# Patient Record
Sex: FEMALE | Race: BLACK OR AFRICAN AMERICAN
[De-identification: names, ages, dates, MRNs, and addresses within clinical notes are randomized per-mention and may not be internally consistent; named-entity substitution may affect disease eponyms.]

---
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
## 2018-09-26 NOTE — PHYS DOC
Past Medical History


Past Medical History:  Depression, Hypertension, Other


Additional Past Medical Histor:  TUMORS


Past Surgical History:  Tonsillectomy


Additional Information:  


0.25 PPD


Alcohol Use:  Occasionally


Drug Use:  None





Adult General


Chief Complaint


Chief Complaint:  COUGH





HPI


HPI





Patient is a 38  year old female with history of depression, hypertension, 

smoking, who presents today complaining of a productive cough, body aches, and 

bilateral ear pain for 4 days. Patient denies any fever. Denies any chest pain 

or shortness of breath.





Review of Systems


Review of Systems





Constitutional: Reports body aches. Denies fever or chills []


Eyes: Denies change in visual acuity, redness, or eye pain []


HENT: Reports bilateral ear pain. Denies nasal congestion or sore throat []


Respiratory: Reports productive cough, denies shortness of breath []


Cardiovascular: No additional information not addressed in HPI []


GI: Denies abdominal pain, nausea, vomiting, bloody stools or diarrhea []


: Denies dysuria or hematuria []


Musculoskeletal: Denies back pain or joint pain []


Integument: Denies rash or skin lesions []


Neurologic: Denies headache, focal weakness or sensory changes []








All other systems were reviewed and found to be within normal limits, except as 

documented in this note.





Current Medications


Current Medications





Current Medications








 Medications


  (Trade)  Dose


 Ordered  Sig/Momo  Start Time


 Stop Time Status Last Admin


Dose Admin


 


 Albuterol/


 Ipratropium


  (Duoneb)  3 ml  1X  ONCE  9/26/18 14:00


 9/26/18 14:01 DC  





 


 Benzonatate


  (Tessalon Perle)  100 mg  1X  ONCE  9/26/18 14:00


 9/26/18 14:01 DC 9/26/18 14:13


100 MG











Allergies


Allergies





Allergies








Coded Allergies Type Severity Reaction Last Updated Verified


 


  Penicillins Allergy Intermediate hives 8/7/16 Yes


 


  morphine Allergy Unknown  12/16/16 Yes











Physical Exam


Physical Exam





Constitutional: Well developed, well nourished, no acute distress, non-toxic 

appearance. []


HENT: Normocephalic, atraumatic, bilateral external ears normal, oropharynx 

moist, no oral exudates, nose normal. []


Eyes: PERRLA, EOMI, conjunctiva normal, no discharge. [] 


Neck: Normal range of motion, no tenderness, supple, no stridor. [] 


Cardiovascular:Heart rate regular rhythm, no murmur []


Lungs & Thorax: Diminished breath sounds to bilateral posterior lung bases.


Abdomen: Bowel sounds normal, soft, no tenderness, no masses, no pulsatile 

masses. [] 


Skin: Warm, dry, no erythema, no rash. [] 


Back: No tenderness, no CVA tenderness. [] 


Extremities: No tenderness, no cyanosis, no clubbing, ROM intact, no edema. [] 


Neurologic: Alert and oriented X 3, normal motor function, normal sensory 

function, no focal deficits noted. []


Psychologic: Affect normal, judgement normal, mood normal. []





Current Patient Data


Vital Signs





 Vital Signs








  Date Time  Temp Pulse Resp B/P (MAP) Pulse Ox O2 Delivery O2 Flow Rate FiO2


 


9/26/18 13:16 97.8 78 20 135/84 (101) 98 Room Air  





 97.8       











EKG


EKG


[]





Radiology/Procedures


Radiology/Procedures


[]PROCEDURE: CHEST PA & LATERAL





 


Chest, PA and Lateral:


 


Technique:  PA and lateral views of the chest were obtained.


 


History:  Cough, weakness.


 


Comparison: 12/16/2016.


 


Findings:


 


 The heart and pulmonary vasculature appear within normal limits. Mild 


airspace opacity identified in the right lower lobe of the lung.. The 


pleural margins are clear.


 


Impression:


 


Mild airspace opacity identified in the right lower lobe lung likely 


atelectasis or infiltrate. Follow-up to resolution.. 


 


Electronically signed by: Ashok Tracey MD (9/26/2018 1:41 PM) [id number]














DICTATED and SIGNED BY:     ASHOK TRACEY MD


DATE:     09/26/18 1340





Course & Med Decision Making


Course & Med Decision Making


Pertinent Labs and Imaging studies reviewed. (See chart for details)





This is a 38-year-old female patient presenting to the ED today with a 

productive cough bilateral ear pain and body aches for 4 days. Chest x-ray 

interpreted by radiologist was suspicious for right lower lobe infiltrate or 

atelectasis. Consider physical exam him leaning towards infiltrate. Patient was 

encouraged to consider smoking cessation. Discharged with azithromycin. Also 

given prescription for albuterol inhaler, 80 ml of codeine with promethazine 

and Tessalon Perles. Follow-up with primary care doctor in 2-7 days.














Staff Physician Addendum:


I was working in the ER during the course of this patient's visit.  I was 

available for consultation as needed, but I was not directly involved in the 

care of this patient.   


clark Lima Disclaimer


Dragon Disclaimer


This electronic medical record was generated, in whole or in part, using a 

voice recognition dictation system.





Departure


Departure


Impression:  


 Primary Impression:  


 Right lower lobe pneumonia


 Additional Impressions:  


 Otalgia of both ears


 Body aches


 Smoking addiction


Disposition:  01 HOME, SELF-CARE


Condition:  STABLE


Referrals:  


NO PCP (PCP)


Follow-up with your doctor in 2-7 days


Patient Instructions:  Otalgia-Brief, Pneumonia, Adult, Smoking Cessation





Additional Instructions:  


You have pneumonia. Please consider smoking cessation. Please use the 

prescribed medications as ordered. Please complete your oral antibiotics. 

Please follow-up with your own doctor in one week. Come back to the emergency 

room at any point symptoms worsen.


Scripts


Promethazine Hcl/Codeine (PROMETHAZINE-CODEINE SYRUP) 118 Ml Syrup


5 ML PO Q4-6HRS, #80 ML


   Prov: DONNIE CUTLER         9/26/18 


Benzonatate (TESSALON PERLE) 100 Mg Capsule


1 CAP PO TID, #30 CAP


   Prov: DONNIE CUTLER         9/26/18 


Albuterol Sulfate (VENTOLIN HFA INHALER) 18 Gm Hfa.aer.ad


2 PUFF INH Q4HRS for FOR ASTHMA, #1 INHALER 0 Refills


   Prov: DONNIE CUTLER         9/26/18 


Azithromycin (ZITHROMAX) 250 Mg Tablet


1 PKG PO UD, #1 PKG


   Prov: DONNIE CUTLER         9/26/18





Problem Qualifiers








 Primary Impression:  


 Right lower lobe pneumonia


 Pneumonia type:  due to unspecified organism  Qualified Codes:  J18.1 - Lobar 

pneumonia, unspecified organism








DONNIE CUTLER Sep 26, 2018 14:14


TARAS KENT MD Sep 30, 2018 18:59

## 2018-09-26 NOTE — RAD
Chest, PA and Lateral:

 

Technique:  PA and lateral views of the chest were obtained.

 

History:  Cough, weakness.

 

Comparison: 12/16/2016.

 

Findings:

 

 The heart and pulmonary vasculature appear within normal limits. Mild 

airspace opacity identified in the right lower lobe of the lung.. The 

pleural margins are clear.

 

Impression:

 

Mild airspace opacity identified in the right lower lobe lung likely 

atelectasis or infiltrate. Follow-up to resolution.. 

 

Electronically signed by: Ashok Tracey MD (9/26/2018 1:41 PM) [id number]

## 2019-02-21 NOTE — RAD
PELVIS W/TV:  2/21/2019 6:41 PM

 

INDICATION: 38 years old Female. PELVIC PAIN.

 

COMPARISON: None.

 

TECHNIQUE: Transabdominal and transvaginal sonographic evaluation of the 

pelvis was performed.  Grayscale, color Doppler and spectral waveform 

analysis was performed.

 

FINDINGS: 

 

UTERUS:

Size: 8.8 x 4.8 x 6.4 cm.

Masses: Within the cervix there is a 9 x 8 mm circumscribed mass with 

calcification which may represent a uterine fibroid versus complicated 

nabothian cyst. There is a posterior uterine fibroid measuring 2.1 x 1.3 

cm in the mid body which is partially intramural and partially subserosal.

Endometrium: 15.3 mm.

 

RIGHT OVARY: 3.5 x 3.1 x 3.0 cm. Follicular changes are present.

 

LEFT OVARY: 3.6 x 1.8 x 1.7 cm.  Follicular changes are present.

 

Arterial and venous waveforms are identified bilaterally at the time of 

imaging.

 

FREE FLUID: None.

 

URINARY BLADDER: Unremarkable.

 

 

IMPRESSION:

 

1. 9 x 8 mm circumscribed mass with calcification within the cervix. This 

may represent a complicated nabothian cyst versus less likely fibroid.

2. Posterior uterine fibroid is partially intramural and partially 

subserosal measuring 2.1 x 1.3 x 1.3 cm.

 

Electronically signed by: Winter Hensley MD (2/21/2019 7:36 PM) 

[facility]

## 2019-02-21 NOTE — PHYS DOC
Past Medical History


Past Medical History:  Depression, Hypertension


Additional Past Medical Histor:  TUMORS


 (DONNIE CUTLER)


Past Surgical History:  Tonsillectomy


 (DONNIE CUTLER)


Alcohol Use:  Rarely


Drug Use:  None


 (DONNIE CUTLER)





Adult General


Chief Complaint


Chief Complaint:  VAGINAL BLEEDING





HPI


HPI





Patient is a 38  year old female who presents who presents complaining of 

spotting for 3 weeks. Patient also is complaining of pelvic pain described as 

intermittent and throbbing rated at 10 out of 10. She states she feels her 

overall result touching each other. Patient denies any nausea vomiting. Denies 

any chance she is pregnant. Denies any fever. She is also complaining of 

dysuria.


 (DONNIE CUTLER)





Review of Systems


Review of Systems





Constitutional: Denies fever or chills []


Eyes: Denies change in visual acuity, redness, or eye pain []


HENT: Denies nasal congestion or sore throat []


Respiratory: Denies cough or shortness of breath []


Cardiovascular: No additional information not addressed in HPI []


GI: Reports abdominal cramping, spotting, denies nausea, vomiting, bloody 

stools or diarrhea []


: Reports dysuria, hematuria []


Musculoskeletal: Denies back pain or joint pain []


Integument: Denies rash or skin lesions []


Neurologic: Denies headache, focal weakness or sensory changes []








All other systems were reviewed and found to be within normal limits, except as 

documented in this note.


 (DONNIE CUTLER)





Current Medications


Current Medications





Current Medications








 Medications


  (Trade)  Dose


 Ordered  Sig/Momo  Start Time


 Stop Time Status Last Admin


Dose Admin


 


 Azithromycin


  (Zithromax)  1,000 mg  1X  ONCE  2/21/19 19:45


 2/21/19 19:46 DC 2/21/19 19:46


1,000 MG


 


 Ceftriaxone Sodium


  (Rocephin Im)  250 mg  1X  ONCE  2/21/19 19:45


 2/21/19 19:46 DC 2/21/19 19:47


250 MG


 


 Fentanyl Citrate


  (Fentanyl 2ml


 Vial)  50 mcg  1X  ONCE  2/21/19 18:15


 2/21/19 18:16 DC 2/21/19 19:06


50 MCG


 


 Metronidazole


  (Flagyl)  2,000 mg  1X  ONCE  2/21/19 19:45


 2/21/19 19:46 DC 2/21/19 19:45


2,000 MG


 


 Ondansetron HCl


  (Zofran)  4 mg  1X  ONCE  2/21/19 18:15


 2/21/19 18:16 DC 2/21/19 19:05


4 MG





 (ELHAM FLORES DO)





Allergies


Allergies





Allergies








Coded Allergies Type Severity Reaction Last Updated Verified


 


  Penicillins Allergy Intermediate hives 8/7/16 Yes


 


  morphine Allergy Unknown  12/16/16 Yes





 (ELHAM FLORES DO)





Physical Exam


Physical Exam





Constitutional: Well developed, well nourished, no acute distress, non-toxic 

appearance. []


HENT: Normocephalic, atraumatic, bilateral external ears normal, oropharynx 

moist, no oral exudates, nose normal. []


Eyes: PERRLA, EOMI, conjunctiva normal, no discharge. [] 


Neck: Normal range of motion, no tenderness, supple, no stridor. [] 


Cardiovascular:Heart rate regular rhythm, no murmur []


Lungs & Thorax:  Bilateral breath sounds clear to auscultation []


Abdomen: Bowel sounds normal, soft, no tenderness, no masses, no pulsatile 

masses. [] 


Pelvic exam


External pelvic appears normal, cervix not visualized due to body habitus. No 

adnexal tenderness, mild CMT and exam, mild amount of white discharge in the 

vaginal vault. No bleeding noted in exam.


Skin: Warm, dry, no erythema, no rash. [] 


Back: No tenderness, no CVA tenderness. [] 


Extremities: No tenderness, no cyanosis, no clubbing, ROM intact, no edema. [] 


Neurologic: Alert and oriented X 3, normal motor function, normal sensory 

function, no focal deficits noted. []


Psychologic: Affect normal, judgement normal, mood normal. []


 (DONNIE CUTLER)





Current Patient Data


Vital Signs





 Vital Signs








  Date Time  Temp Pulse Resp B/P (MAP) Pulse Ox O2 Delivery O2 Flow Rate FiO2


 


2/21/19 19:54  78  129/76 (93) 98 Room Air  


 


2/21/19 18:25   23     


 


2/21/19 16:45 98.8       





 98.8       





 (ELHAM FLORES DO)


Lab Values





 Laboratory Tests








Test


 2/21/19


15:50 2/21/19


18:03 2/21/19


18:15


 


Urine Collection Type Unknown    


 


Urine Color Yellow    


 


Urine Clarity Cloudy    


 


Urine pH 5.5    


 


Urine Specific Gravity >=1.030    


 


Urine Protein


 Negative mg/dL


(NEG-TRACE) 


 





 


Urine Glucose (UA)


 Negative mg/dL


(NEG) 


 





 


Urine Ketones (Stick)


 Negative mg/dL


(NEG) 


 





 


Urine Blood


 Negative (NEG)


 


 





 


Urine Nitrite


 Negative (NEG)


 


 





 


Urine Bilirubin


 Negative (NEG)


 


 





 


Urine Urobilinogen Dipstick


 0.2 mg/dL (0.2


mg/dL) 


 





 


Urine Leukocyte Esterase


 Moderate (NEG)


 


 





 


Urine RBC


 Occ /HPF (0-2)


 


 





 


Urine WBC


 20-40 /HPF


(0-4) 


 





 


Urine Squamous Epithelial


Cells Many /LPF  


 


 





 


Urine Bacteria


 Moderate /HPF


(0-FEW) 


 





 


Urine Mucus Marked /LPF    


 


Urine Trichomonas Present    


 


POC Urine HCG, Qualitative


 


 Hcg negative


(Negative) 





 


White Blood Count


 


 


 10.3 x10^3/uL


(4.0-11.0)


 


Red Blood Count


 


 


 4.14 x10^6/uL


(3.50-5.40)


 


Hemoglobin


 


 


 11.9 g/dL


(12.0-15.5)  L


 


Hematocrit


 


 


 36.7 %


(36.0-47.0)


 


Mean Corpuscular Volume


 


 


 89 fL ()





 


Mean Corpuscular Hemoglobin   29 pg (25-35)  


 


Mean Corpuscular Hemoglobin


Concent 


 


 32 g/dL


(31-37)


 


Red Cell Distribution Width


 


 


 16.4 %


(11.5-14.5)  H


 


Platelet Count


 


 


 186 x10^3/uL


(140-400)


 


Neutrophils (%) (Auto)   64 % (31-73)  


 


Lymphocytes (%) (Auto)   31 % (24-48)  


 


Monocytes (%) (Auto)   4 % (0-9)  


 


Eosinophils (%) (Auto)   0 % (0-3)  


 


Basophils (%) (Auto)   0 % (0-3)  


 


Neutrophils # (Auto)


 


 


 6.6 x10^3uL


(1.8-7.7)


 


Lymphocytes # (Auto)


 


 


 3.2 x10^3/uL


(1.0-4.8)


 


Monocytes # (Auto)


 


 


 0.4 x10^3/uL


(0.0-1.1)


 


Eosinophils # (Auto)


 


 


 0.0 x10^3/uL


(0.0-0.7)


 


Basophils # (Auto)


 


 


 0.0 x10^3/uL


(0.0-0.2)


 


Sodium Level


 


 


 138 mmol/L


(136-145)


 


Potassium Level


 


 


 3.6 mmol/L


(3.5-5.1)


 


Chloride Level


 


 


 103 mmol/L


()


 


Carbon Dioxide Level


 


 


 27 mmol/L


(21-32)


 


Anion Gap   8 (6-14)  


 


Blood Urea Nitrogen


 


 


 14 mg/dL


(7-20)


 


Creatinine


 


 


 0.8 mg/dL


(0.6-1.0)


 


Estimated GFR


(Cockcroft-Gault) 


 


 96.1  





 


BUN/Creatinine Ratio   18 (6-20)  


 


Glucose Level


 


 


 138 mg/dL


(70-99)  H


 


Calcium Level


 


 


 8.5 mg/dL


(8.5-10.1)


 


Total Bilirubin


 


 


 0.1 mg/dL


(0.2-1.0)  L


 


Aspartate Amino Transferase


(AST) 


 


 12 U/L (15-37)


L


 


Alanine Aminotransferase (ALT)


 


 


 16 U/L (14-59)





 


Alkaline Phosphatase


 


 


 79 U/L


()


 


Total Protein


 


 


 7.3 g/dL


(6.4-8.2)


 


Albumin


 


 


 3.3 g/dL


(3.4-5.0)  L


 


Albumin/Globulin Ratio


 


 


 0.8 (1.0-1.7)


L


 


Lipase


 


 


 86 U/L


()





 Laboratory Tests


2/21/19 18:15








 Laboratory Tests


2/21/19 18:15











Microbiology


2/21/19 Wet Prep - Final, Complete


          


2/21/19 Urine Culture - Final, Complete


          


2/21/19 Urine Culture Result 1 (CHUCK) - Final, Complete


          


 (ELHAM FLORES DO)





EKG


EKG


[]


 (DONNIE CUTLER)





Radiology/Procedures


Radiology/Procedures


[]


 (DONNIE CUTLER)





Course & Med Decision Making


Course & Med Decision Making


Pertinent Labs and Imaging studies reviewed. (See chart for details)





This is a 38-year-old female patient presenting to the ED today complaining of 

pelvic pain, vaginal spotting and dysuria symptoms for 3 weeks. Negative urine 

hCG. CBC with a normal WBC. CMP with no acute findings. Urine analysis is noted 

for Trichomonas, moderate amount of leukocytes, white prep noted for 

Trichomonas and bacterial vaginosis. Patient treated for STDs in the ED. Pelvic 

ultrasound was noted for fibroid or nabothian cyst. Recommended this patient to 

follow up with an OB/GYN which I provided. Also discussed STD education 

especially the need to use protection. 





 (DONNIE CUTLER)





Dragon Disclaimer


Dragon Disclaimer


This electronic medical record was generated, in whole or in part, using a 

voice recognition dictation system.


 (DONNIE CUTLER)





Departure


Departure


Impression:  


 Primary Impression:  


 Urinary tract infection


 Additional Impressions:  


 Trichomonas infection


 Bacterial vaginosis


 Fibroids


Disposition:  01 HOME, SELF-CARE


Condition:  STABLE


Referrals:  


NO PCP (PCP)








MANSOOR BAZAN MD


follow up next week


Patient Instructions:  Fibroids, Easy-to-Read, Trichomonas, Test, Urinary Tract 

Infection





Additional Instructions:  


You were seen in the emergency room, you have a sexually transmitted diseases, 

UTI and bacterial vaginosis. Complete your prescribed antibiotics. Contact all 

your sex partners, let them know you were diagnosed with an STD and ask them to 

seek treatment. Your ultrasound shows  you have fibroids. Follow up with the 

provided OBGYN in 2 weeks. No sex for two weeks


Scripts


Sulfamethoxazole/Trimethoprim (BACTRIM DS TABLET) 1 Each Tablet


1 TAB PO BID, #14 TAB


   Prov: DONNIE CUTLER         2/21/19 


Metronidazole (FLAGYL) 500 Mg Tablet


1 TAB PO BID, #10 TAB


   Prov: DONNIE CUTLER         2/21/19





Attending Signature


Attending Signature


I have reviewed the PA/NP's note and plan of care. I was available for 

consultation as needed during the patient's visit in the emergency department. 

I agree with the clinical impression, plan, and disposition.


 (ELHAM FLORES DO)





Problem Qualifiers








 Primary Impression:  


 Urinary tract infection


 Urinary tract infection type:  site unspecified  Hematuria presence:  without 

hematuria  Qualified Codes:  N39.0 - Urinary tract infection, site not specified








DONNIE CUTLER Feb 21, 2019 18:51


ELHAM FLORES DO Feb 25, 2019 13:58

## 2019-12-07 ENCOUNTER — HOSPITAL ENCOUNTER (EMERGENCY)
Dept: HOSPITAL 61 - ER | Age: 39
LOS: 1 days | Discharge: HOME | End: 2019-12-08
Payer: SELF-PAY

## 2019-12-07 VITALS — WEIGHT: 293 LBS | BODY MASS INDEX: 47.09 KG/M2 | HEIGHT: 66 IN

## 2019-12-07 DIAGNOSIS — Z88.0: ICD-10-CM

## 2019-12-07 DIAGNOSIS — Z88.6: ICD-10-CM

## 2019-12-07 DIAGNOSIS — M54.5: Primary | ICD-10-CM

## 2019-12-07 DIAGNOSIS — F32.9: ICD-10-CM

## 2019-12-07 DIAGNOSIS — G89.29: ICD-10-CM

## 2019-12-07 DIAGNOSIS — Z90.89: ICD-10-CM

## 2019-12-07 DIAGNOSIS — I10: ICD-10-CM

## 2019-12-07 LAB
APTT PPP: YELLOW S
BACTERIA #/AREA URNS HPF: (no result) /HPF
BILIRUB UR QL STRIP: NEGATIVE
FIBRINOGEN PPP-MCNC: CLEAR MG/DL
NITRITE UR QL STRIP: NEGATIVE
PH UR STRIP: 7.5 [PH]
PROT UR STRIP-MCNC: NEGATIVE MG/DL
RBC #/AREA URNS HPF: 0 /HPF (ref 0–2)
SQUAMOUS #/AREA URNS LPF: (no result) /LPF
UROBILINOGEN UR-MCNC: 0.2 MG/DL
WBC #/AREA URNS HPF: (no result) /HPF (ref 0–4)

## 2019-12-07 PROCEDURE — 96372 THER/PROPH/DIAG INJ SC/IM: CPT

## 2019-12-07 PROCEDURE — 87086 URINE CULTURE/COLONY COUNT: CPT

## 2019-12-07 PROCEDURE — 81001 URINALYSIS AUTO W/SCOPE: CPT

## 2019-12-07 PROCEDURE — 81025 URINE PREGNANCY TEST: CPT

## 2019-12-07 PROCEDURE — 99284 EMERGENCY DEPT VISIT MOD MDM: CPT

## 2019-12-07 NOTE — PHYS DOC
Past Medical History


Past Medical History:  Depression, Hypertension


Additional Past Medical Histor:  TUMORS


Past Surgical History:  Tonsillectomy


Alcohol Use:  Rarely


Drug Use:  None





Adult General


Chief Complaint


Chief Complaint:  LOWER EXT PAIN





HPI


HPI





Patient is a 39 AA year old female with history of chronic back pain uterine 

fibroids who presents with low back pain radiating to her leg and pelvic pain. 

Symptoms are long-standing but patient reports increased back pain over the past

several days. Patient has taken over-the-counter pain medication without relief.

Patient has been seen in the emergency department various times for pelvic pain 

and pain-related complaints. She is currently without health insurance and has 

not followed up with a gynecologist or primary care physician. Patient noted to 

be hypertensive, 180s over 110s. Patient's previously prescribed was in her 

throat but has not taking any medications for the past 3 months. No chest pain, 

palpitations, decreased urinary output. No other acute symptoms or complaints.[]





Review of Systems


Review of Systems


ROS as per HPI





All other systems were reviewed and found to be within normal limits, except as 

documented in this note.





Current Medications


Current Medications





Current Medications








 Medications


  (Trade)  Dose


 Ordered  Sig/Momo  Start Time


 Stop Time Status Last Admin


Dose Admin


 


 Clonidine HCl


  (Catapres)  0.2 mg  1X  ONCE  12/7/19 23:00


 12/7/19 23:01 DC 12/7/19 22:51


0.2 MG


 


 Ketorolac


 Tromethamine


  (Toradol Im)  60 mg  1X  ONCE  12/7/19 23:30


 12/7/19 23:31 DC 12/7/19 23:01


60 MG


 


 Lisinopril


  (Prinivil)  20 mg  1X  ONCE  12/7/19 23:30


 12/7/19 23:31 DC 12/8/19 00:05


20 MG











Allergies


Allergies





Allergies








Coded Allergies Type Severity Reaction Last Updated Verified


 


  Penicillins Allergy Intermediate hives 8/7/16 Yes


 


  morphine Allergy Intermediate  12/7/19 Yes











Physical Exam


Physical Exam





Constitutional: Well developed, well nourished, moderate discomfort secondary to

 pain. []


HENT: Normocephalic, atraumatic, bilateral external ears normal, oropharynx 

moist, nose normal. []


Eyes: PERRLA, EOMI, conjunctiva hetal. [] 


Neck: Normal range of motion, no tenderness. [] 


Cardiovascular:Heart rate regular rhythm, no murmur []


Lungs & Thorax:  Bilateral breath sounds clear to auscultation []


Abdomen: Bowel sounds normal, soft, no tenderness, obesity compromising exam. []

 


Skin: Warm, dry. [] 


Back: Diffuse low back pain. [] 


Extremities: No tenderness, no edema. [] 


Neurologic: Alert and oriented X 3, normal motor function, normal sensory 

function, no focal deficits noted. []


Psychologic: Affect normal, judgement normal, mood normal. []





Current Patient Data


Vital Signs





                                   Vital Signs








  Date Time  Temp Pulse Resp B/P (MAP) Pulse Ox O2 Delivery O2 Flow Rate FiO2


 


12/8/19 00:05  98  149/119    


 


12/8/19 00:00     97 Room Air  


 


12/7/19 22:11 98.1  18     





 98.1       








Lab Values





                                Laboratory Tests








Test


 12/7/19


22:21 12/7/19


22:25


 


Urine Collection Type Unknown   


 


Urine Color Yellow   


 


Urine Clarity Clear   


 


Urine pH 7.5   


 


Urine Specific Gravity >=1.030   


 


Urine Protein


 Negative mg/dL


(NEG-TRACE) 





 


Urine Glucose (UA)


 Negative mg/dL


(NEG) 





 


Urine Ketones (Stick)


 Negative mg/dL


(NEG) 





 


Urine Blood


 Negative (NEG)


 





 


Urine Nitrite


 Negative (NEG)


 





 


Urine Bilirubin


 Negative (NEG)


 





 


Urine Urobilinogen Dipstick


 0.2 mg/dL (0.2


mg/dL) 





 


Urine Leukocyte Esterase


 Negative (NEG)


 





 


Urine RBC 0 /HPF (0-2)   


 


Urine WBC


 Occ /HPF (0-4)


 





 


Urine Squamous Epithelial


Cells Mod /LPF  


 





 


Urine Bacteria


 Moderate /HPF


(0-FEW) 





 


Urine Mucus Mod /LPF   


 


POC Urine HCG, Qualitative


 


 Hcg negative


(Negative)











EKG


EKG


[]





Radiology/Procedures


Radiology/Procedures


[]





Course & Med Decision Making


Course & Med Decision Making


Pertinent Labs and Imaging studies reviewed. (See chart for details)





[Producible flank pain with radiculopathy and chronic pelvic pain history of 

uterine fibroids. Patient resting well after treatment.. Clonidine given to 

address chronic hypertension and patient restarted on lisinopril. Patient 

instructed to follow-up with local PCP for further management. Return 

precautions reviewed.]





Dragon Disclaimer


Dragon Disclaimer


This electronic medical record was generated, in whole or in part, using a voice

recognition dictation system.





Departure


Departure


Impression:  


   Primary Impression:  


   Low back pain


   Additional Impression:  


   HTN (hypertension)


Disposition:  01 HOME, SELF-CARE


Condition:  IMPROVED


Referrals:  


NO PCP (PCP)


Scripts


Cyclobenzaprine Hcl (CYCLOBENZAPRINE HCL) 10 Mg Tablet


1 TAB PO TID, #30 TAB


   Prov: TIERNEY PISANO DO         12/7/19 


Lisinopril/Hydrochlorothiazide (LISINOPRIL-HCTZ 10-12.5 MG TAB) 1 Each Tablet


1 TAB PO DAILY, #30 TAB 0 Refills


   Prov: TIERNEY PISANO DO         12/7/19





Problem Qualifiers











TIERNEY PISANO DO                    Dec 7, 2019 22:55

## 2019-12-08 VITALS — DIASTOLIC BLOOD PRESSURE: 119 MMHG | SYSTOLIC BLOOD PRESSURE: 149 MMHG

## 2020-01-25 ENCOUNTER — HOSPITAL ENCOUNTER (EMERGENCY)
Dept: HOSPITAL 61 - ER | Age: 40
Discharge: HOME | End: 2020-01-25
Payer: SELF-PAY

## 2020-01-25 VITALS — WEIGHT: 293 LBS | HEIGHT: 64 IN | BODY MASS INDEX: 50.02 KG/M2

## 2020-01-25 VITALS — DIASTOLIC BLOOD PRESSURE: 99 MMHG | SYSTOLIC BLOOD PRESSURE: 157 MMHG

## 2020-01-25 DIAGNOSIS — R68.83: ICD-10-CM

## 2020-01-25 DIAGNOSIS — R10.9: ICD-10-CM

## 2020-01-25 DIAGNOSIS — Z88.6: ICD-10-CM

## 2020-01-25 DIAGNOSIS — R11.2: Primary | ICD-10-CM

## 2020-01-25 DIAGNOSIS — F32.9: ICD-10-CM

## 2020-01-25 DIAGNOSIS — Z90.89: ICD-10-CM

## 2020-01-25 DIAGNOSIS — R19.7: ICD-10-CM

## 2020-01-25 DIAGNOSIS — Z88.0: ICD-10-CM

## 2020-01-25 DIAGNOSIS — I10: ICD-10-CM

## 2020-01-25 LAB
ALBUMIN SERPL-MCNC: 3.5 G/DL (ref 3.4–5)
ALBUMIN/GLOB SERPL: 0.8 {RATIO} (ref 1–1.7)
ALP SERPL-CCNC: 77 U/L (ref 46–116)
ALT SERPL-CCNC: 25 U/L (ref 14–59)
ANION GAP SERPL CALC-SCNC: 7 MMOL/L (ref 6–14)
APTT PPP: YELLOW S
AST SERPL-CCNC: 19 U/L (ref 15–37)
BACTERIA #/AREA URNS HPF: (no result) /HPF
BASOPHILS # BLD AUTO: 0.1 X10^3/UL (ref 0–0.2)
BASOPHILS NFR BLD: 1 % (ref 0–3)
BILIRUB SERPL-MCNC: 0.2 MG/DL (ref 0.2–1)
BILIRUB UR QL STRIP: NEGATIVE
BUN SERPL-MCNC: 15 MG/DL (ref 7–20)
BUN/CREAT SERPL: 21 (ref 6–20)
CALCIUM SERPL-MCNC: 8.5 MG/DL (ref 8.5–10.1)
CHLORIDE SERPL-SCNC: 106 MMOL/L (ref 98–107)
CO2 SERPL-SCNC: 29 MMOL/L (ref 21–32)
CREAT SERPL-MCNC: 0.7 MG/DL (ref 0.6–1)
EOSINOPHIL NFR BLD: 0 % (ref 0–3)
EOSINOPHIL NFR BLD: 0 X10^3/UL (ref 0–0.7)
ERYTHROCYTE [DISTWIDTH] IN BLOOD BY AUTOMATED COUNT: 15.6 % (ref 11.5–14.5)
FIBRINOGEN PPP-MCNC: (no result) MG/DL
GFR SERPLBLD BASED ON 1.73 SQ M-ARVRAT: 111.6 ML/MIN
GLOBULIN SER-MCNC: 4.2 G/DL (ref 2.2–3.8)
GLUCOSE SERPL-MCNC: 80 MG/DL (ref 70–99)
HCT VFR BLD CALC: 38.8 % (ref 36–47)
HGB BLD-MCNC: 12.7 G/DL (ref 12–15.5)
LIPASE: 60 U/L (ref 73–393)
LYMPHOCYTES # BLD: 3.2 X10^3/UL (ref 1–4.8)
LYMPHOCYTES NFR BLD AUTO: 30 % (ref 24–48)
MCH RBC QN AUTO: 30 PG (ref 25–35)
MCHC RBC AUTO-ENTMCNC: 33 G/DL (ref 31–37)
MCV RBC AUTO: 91 FL (ref 79–100)
MONO #: 0.6 X10^3/UL (ref 0–1.1)
MONOCYTES NFR BLD: 6 % (ref 0–9)
NEUT #: 6.6 X10^3/UL (ref 1.8–7.7)
NEUTROPHILS NFR BLD AUTO: 62 % (ref 31–73)
NITRITE UR QL STRIP: NEGATIVE
PH UR STRIP: 5.5 [PH]
PLATELET # BLD AUTO: 265 X10^3/UL (ref 140–400)
POTASSIUM SERPL-SCNC: 4.4 MMOL/L (ref 3.5–5.1)
PROT SERPL-MCNC: 7.7 G/DL (ref 6.4–8.2)
PROT UR STRIP-MCNC: NEGATIVE MG/DL
RBC # BLD AUTO: 4.27 X10^6/UL (ref 3.5–5.4)
SODIUM SERPL-SCNC: 142 MMOL/L (ref 136–145)
SQUAMOUS #/AREA URNS LPF: (no result) /LPF
UROBILINOGEN UR-MCNC: 0.2 MG/DL
WBC # BLD AUTO: 10.6 X10^3/UL (ref 4–11)
WBC #/AREA URNS HPF: (no result) /HPF (ref 0–4)

## 2020-01-25 PROCEDURE — 99284 EMERGENCY DEPT VISIT MOD MDM: CPT

## 2020-01-25 PROCEDURE — 36415 COLL VENOUS BLD VENIPUNCTURE: CPT

## 2020-01-25 PROCEDURE — 81001 URINALYSIS AUTO W/SCOPE: CPT

## 2020-01-25 PROCEDURE — 84484 ASSAY OF TROPONIN QUANT: CPT

## 2020-01-25 PROCEDURE — 96361 HYDRATE IV INFUSION ADD-ON: CPT

## 2020-01-25 PROCEDURE — 83690 ASSAY OF LIPASE: CPT

## 2020-01-25 PROCEDURE — 84702 CHORIONIC GONADOTROPIN TEST: CPT

## 2020-01-25 PROCEDURE — 80053 COMPREHEN METABOLIC PANEL: CPT

## 2020-01-25 PROCEDURE — 85025 COMPLETE CBC W/AUTO DIFF WBC: CPT

## 2020-01-25 PROCEDURE — 96374 THER/PROPH/DIAG INJ IV PUSH: CPT

## 2020-01-25 NOTE — PHYS DOC
Past Medical History


Past Medical History:  Depression, Hypertension


Additional Past Medical Histor:  TUMORS


Past Surgical History:  Tonsillectomy


Alcohol Use:  Rarely


Drug Use:  None





Adult General


Chief Complaint


Chief Complaint:  NAUSEA/VOMITING/DIARRHA





HPI


HPI





Patient is a 39  year old [female] who presents with [nausea, vomiting, 

diarrhea. Patient reports for the last couple days, she has had nausea vomiting 

diarrhea. States that anytime she tried eaten anything or drink, comes back up. 

States she's been dry heaving. States she has not taken anything for the patient

 does not have anything for this. States she has some occasional abdominal 

discomfort. Denies any fevers. Denies any headaches. States that during started 

4 days ago after eating at [employer], she normally does not. Denies any 

abnormal taste of food. Denies any exposure to other ill persons at home.]





Review of Systems


Review of Systems





Constitutional: Denies fever or chills []





HENT: Denies nasal congestion or sore throat []


Respiratory: Denies cough or shortness of breath []


Cardiovascular: No additional information not addressed in HPI []


GI: Reports intermittent abdominal cramping. Reports nausea, vomiting, diarrhea 

over the last few days. States she has felt chilled dry and a little bit.[]


: Denies dysuria or hematuria []


Musculoskeletal: Denies back pain or joint pain []


Integument: Denies rash or skin lesions []


Neurologic: Denies headache, focal weakness or sensory changes []


Endocrine: Denies polyuria or polydipsia []





All other systems were reviewed and found to be within normal limits, except as 

documented in this note.





Current Medications


Current Medications





Current Medications








 Medications


  (Trade)  Dose


 Ordered  Sig/Momo  Start Time


 Stop Time Status Last Admin


Dose Admin


 


 Ondansetron HCl


  (Zofran)  4 mg  1X  ONCE  1/25/20 18:00


 1/25/20 18:01 DC 1/25/20 18:29


4 MG


 


 Sodium Chloride  1,000 ml @ 


 1,000 mls/hr  1X  ONCE  1/25/20 17:30


 1/25/20 18:29 DC 1/25/20 18:29


1,000 MLS/HR











Allergies


Allergies





Allergies








Coded Allergies Type Severity Reaction Last Updated Verified


 


  Penicillins Allergy Intermediate hives 8/7/16 Yes


 


  morphine Allergy Intermediate  12/7/19 Yes











Physical Exam


Physical Exam





Constitutional: Well developed, well nourished, no acute distress, non-toxic 

appearance. Obese []


HENT: Normocephalic, atraumatic, bilateral external ears normal, oropharynx 

moist, no oral exudates, nose normal. []


Eyes: PERRLA, EOMI, conjunctiva normal, no discharge. [] 


Neck: Normal range of motion, no tenderness, supple, no stridor. [] 


Cardiovascular:Heart rate regular rhythm, no murmur []


Lungs & Thorax:  Bilateral breath sounds clear to auscultation []


Abdomen: Bowel sounds normal, soft, no tenderness, no masses, no pulsatile m

asses. [] 


Skin: Warm, dry, no erythema, no rash. [] 


Back: No tenderness, no CVA tenderness. [] 


Extremities: No tenderness, no cyanosis, no clubbing, ROM intact, no edema. [] 


Neurologic: Alert and oriented X 3, normal motor function, normal sensory 

function, no focal deficits noted. []


Psychologic: Affect normal, judgement normal, mood normal. []





Current Patient Data


Lab Values





                                Laboratory Tests








Test


 1/25/20


17:28 1/25/20


17:55 1/25/20


18:00


 


White Blood Count


 10.6 x10^3/uL


(4.0-11.0) 


 





 


Red Blood Count


 4.27 x10^6/uL


(3.50-5.40) 


 





 


Hemoglobin


 12.7 g/dL


(12.0-15.5) 


 





 


Hematocrit


 38.8 %


(36.0-47.0) 


 





 


Mean Corpuscular Volume


 91 fL ()


 


 





 


Mean Corpuscular Hemoglobin 30 pg (25-35)    


 


Mean Corpuscular Hemoglobin


Concent 33 g/dL


(31-37) 


 





 


Red Cell Distribution Width


 15.6 %


(11.5-14.5)  H 


 





 


Platelet Count


 265 x10^3/uL


(140-400) 


 





 


Neutrophils (%) (Auto) 62 % (31-73)    


 


Lymphocytes (%) (Auto) 30 % (24-48)    


 


Monocytes (%) (Auto) 6 % (0-9)    


 


Eosinophils (%) (Auto) 0 % (0-3)    


 


Basophils (%) (Auto) 1 % (0-3)    


 


Neutrophils # (Auto)


 6.6 x10^3/uL


(1.8-7.7) 


 





 


Lymphocytes # (Auto)


 3.2 x10^3/uL


(1.0-4.8) 


 





 


Monocytes # (Auto)


 0.6 x10^3/uL


(0.0-1.1) 


 





 


Eosinophils # (Auto)


 0.0 x10^3/uL


(0.0-0.7) 


 





 


Basophils # (Auto)


 0.1 x10^3/uL


(0.0-0.2) 


 





 


Urine Collection Type  Unknown   


 


Urine Color  Yellow   


 


Urine Clarity  Cloudy   


 


Urine pH  5.5   


 


Urine Specific Gravity  1.025   


 


Urine Protein


 


 Negative mg/dL


(NEG-TRACE) 





 


Urine Glucose (UA)


 


 Negative mg/dL


(NEG) 





 


Urine Ketones (Stick)


 


 Negative mg/dL


(NEG) 





 


Urine Blood


 


 Moderate (NEG)


 





 


Urine Nitrite


 


 Negative (NEG)


 





 


Urine Bilirubin


 


 Negative (NEG)


 





 


Urine Urobilinogen Dipstick


 


 0.2 mg/dL (0.2


mg/dL) 





 


Urine Leukocyte Esterase


 


 Negative (NEG)


 





 


Urine RBC


 


 11-20 /HPF


(0-2) 





 


Urine WBC


 


 1-4 /HPF (0-4)


 





 


Urine Squamous Epithelial


Cells 


 Many /LPF  


 





 


Urine Bacteria


 


 Few /HPF


(0-FEW) 





 


Urine Mucus  Mod /LPF   


 


Maternal Serum HCG Beta


Subunit 


 


 < 1 mIU/mL


(0-5)


 


Sodium Level


 


 


 142 mmol/L


(136-145)


 


Potassium Level


 


 


 4.4 mmol/L


(3.5-5.1)


 


Chloride Level


 


 


 106 mmol/L


()


 


Carbon Dioxide Level


 


 


 29 mmol/L


(21-32)


 


Anion Gap   7 (6-14)  


 


Blood Urea Nitrogen


 


 


 15 mg/dL


(7-20)


 


Creatinine


 


 


 0.7 mg/dL


(0.6-1.0)


 


Estimated GFR


(Cockcroft-Gault) 


 


 111.6  





 


BUN/Creatinine Ratio   21 (6-20)  H


 


Glucose Level


 


 


 80 mg/dL


(70-99)


 


Calcium Level


 


 


 8.5 mg/dL


(8.5-10.1)


 


Total Bilirubin


 


 


 0.2 mg/dL


(0.2-1.0)


 


Aspartate Amino Transferase


(AST) 


 


 19 U/L (15-37)





 


Alanine Aminotransferase (ALT)


 


 


 25 U/L (14-59)





 


Alkaline Phosphatase


 


 


 77 U/L


()


 


Troponin I Quantitative


 


 


 < 0.017 ng/mL


(0.000-0.055)


 


Total Protein


 


 


 7.7 g/dL


(6.4-8.2)


 


Albumin


 


 


 3.5 g/dL


(3.4-5.0)


 


Albumin/Globulin Ratio


 


 


 0.8 (1.0-1.7)


L


 


Lipase


 


 


 60 U/L


()  L





                                Laboratory Tests


1/25/20 17:28








                                Laboratory Tests


1/25/20 18:00














EKG


EKG


[]





Radiology/Procedures


Radiology/Procedures


[]





Course & Med Decision Making


Course & Med Decision Making


Pertinent Labs and Imaging studies reviewed. (See chart for details)





[Following fluids and medications, patient states she feels a lot better. States

 she does still have several children had diarrhea, however has not had any 

diarrhea in the emergency room. He still does states she feels couldn't to go 

home at this time Offered patient's perception for nausea medications, states 

she does not want any at this time. States she thinks would just be a waste of 

paper. Advised patient to stay hydrated,]





Dragon Disclaimer


Dragon Disclaimer


This electronic medical record was generated, in whole or in part, using a voice

recognition dictation system.





Departure


Departure


Impression:  


   Primary Impression:  


   Nausea & vomiting


Disposition:  01 HOME, SELF-CARE


Condition:  GOOD


Referrals:  


NO PCP (PCP)


Patient Instructions:  Nausea and Vomiting





Additional Instructions:  


As we discussed, make sure you're drinking plenty fluids and getting some rest. 

You may try to take some Imodium for diarrhea once or twice, however do not do 

this for multiple days in a row. Follow-up with your primary care provider in 

the next few days.





Problem Qualifiers








   Primary Impression:  


   Nausea & vomiting


   Vomiting type:  unspecified  Vomiting Intractability:  non-intractable  

   Qualified Codes:  R11.2 - Nausea with vomiting, unspecified








LUDWIG XIONG              Jan 25, 2020 19:07

## 2020-04-30 ENCOUNTER — HOSPITAL ENCOUNTER (EMERGENCY)
Dept: HOSPITAL 61 - ER | Age: 40
Discharge: HOME | End: 2020-04-30
Payer: SELF-PAY

## 2020-04-30 VITALS — BODY MASS INDEX: 50.02 KG/M2 | WEIGHT: 293 LBS | HEIGHT: 64 IN

## 2020-04-30 VITALS — SYSTOLIC BLOOD PRESSURE: 141 MMHG | DIASTOLIC BLOOD PRESSURE: 78 MMHG

## 2020-04-30 DIAGNOSIS — M54.2: Primary | ICD-10-CM

## 2020-04-30 DIAGNOSIS — Z88.0: ICD-10-CM

## 2020-04-30 DIAGNOSIS — F17.200: ICD-10-CM

## 2020-04-30 DIAGNOSIS — I10: ICD-10-CM

## 2020-04-30 DIAGNOSIS — Z88.5: ICD-10-CM

## 2020-04-30 LAB
ANION GAP SERPL CALC-SCNC: 8 MMOL/L (ref 6–14)
BUN SERPL-MCNC: 9 MG/DL (ref 7–20)
CALCIUM SERPL-MCNC: 8.8 MG/DL (ref 8.5–10.1)
CHLORIDE SERPL-SCNC: 105 MMOL/L (ref 98–107)
CO2 SERPL-SCNC: 30 MMOL/L (ref 21–32)
CREAT SERPL-MCNC: 0.9 MG/DL (ref 0.6–1)
GFR SERPLBLD BASED ON 1.73 SQ M-ARVRAT: 83.5 ML/MIN
GLUCOSE SERPL-MCNC: 107 MG/DL (ref 70–99)
POTASSIUM SERPL-SCNC: 3.8 MMOL/L (ref 3.5–5.1)
SODIUM SERPL-SCNC: 143 MMOL/L (ref 136–145)

## 2020-04-30 PROCEDURE — 70498 CT ANGIOGRAPHY NECK: CPT

## 2020-04-30 PROCEDURE — 99284 EMERGENCY DEPT VISIT MOD MDM: CPT

## 2020-04-30 PROCEDURE — 80048 BASIC METABOLIC PNL TOTAL CA: CPT

## 2020-04-30 PROCEDURE — 96374 THER/PROPH/DIAG INJ IV PUSH: CPT

## 2020-04-30 PROCEDURE — 36415 COLL VENOUS BLD VENIPUNCTURE: CPT

## 2020-04-30 PROCEDURE — 81025 URINE PREGNANCY TEST: CPT
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
## 2020-06-20 ENCOUNTER — HOSPITAL ENCOUNTER (EMERGENCY)
Dept: HOSPITAL 61 - ER | Age: 40
Discharge: HOME | End: 2020-06-20
Payer: COMMERCIAL

## 2020-06-20 VITALS — HEIGHT: 64 IN | BODY MASS INDEX: 50.02 KG/M2 | WEIGHT: 293 LBS

## 2020-06-20 VITALS — SYSTOLIC BLOOD PRESSURE: 139 MMHG | DIASTOLIC BLOOD PRESSURE: 85 MMHG

## 2020-06-20 DIAGNOSIS — R11.0: ICD-10-CM

## 2020-06-20 DIAGNOSIS — Z88.0: ICD-10-CM

## 2020-06-20 DIAGNOSIS — R51: ICD-10-CM

## 2020-06-20 DIAGNOSIS — F32.9: ICD-10-CM

## 2020-06-20 DIAGNOSIS — R20.0: ICD-10-CM

## 2020-06-20 DIAGNOSIS — F17.200: ICD-10-CM

## 2020-06-20 DIAGNOSIS — Z88.6: ICD-10-CM

## 2020-06-20 DIAGNOSIS — F07.81: Primary | ICD-10-CM

## 2020-06-20 DIAGNOSIS — I10: ICD-10-CM

## 2020-06-20 DIAGNOSIS — R42: ICD-10-CM

## 2020-06-20 DIAGNOSIS — Z90.89: ICD-10-CM

## 2020-06-20 PROCEDURE — 96374 THER/PROPH/DIAG INJ IV PUSH: CPT

## 2020-06-20 PROCEDURE — 99284 EMERGENCY DEPT VISIT MOD MDM: CPT

## 2020-06-20 PROCEDURE — 81025 URINE PREGNANCY TEST: CPT

## 2020-06-20 PROCEDURE — 96361 HYDRATE IV INFUSION ADD-ON: CPT

## 2020-06-20 PROCEDURE — 96375 TX/PRO/DX INJ NEW DRUG ADDON: CPT
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
